# Patient Record
Sex: MALE | Race: WHITE | NOT HISPANIC OR LATINO | Employment: FULL TIME | URBAN - METROPOLITAN AREA
[De-identification: names, ages, dates, MRNs, and addresses within clinical notes are randomized per-mention and may not be internally consistent; named-entity substitution may affect disease eponyms.]

---

## 2022-01-03 PROCEDURE — U0004 COV-19 TEST NON-CDC HGH THRU: HCPCS | Performed by: FAMILY MEDICINE

## 2022-01-04 ENCOUNTER — TELEPHONE (OUTPATIENT)
Dept: URGENT CARE | Facility: CLINIC | Age: 42
End: 2022-01-04

## 2022-01-04 NOTE — TELEPHONE ENCOUNTER
Covid result positive. Patient called and notified of positive result.   Patient advised to go to ER for any concerning symptoms. Patient advised to follow up with PCP.  Patient advised on plenty of fluids. Alternate tylenol and ibuprofen every 4-6 hours as needed for fever or pain.   Patient will complete isolation.   Patient will inform close contacts to quarentine and follow up with PCP.  Please contact [facility] Department for further Covid guidance.   Patient also given recommendations below.         It is recommended that you self isolate for 10 days from the beginning of your symptoms. If you have no symptoms but have been in close contact with someone who is suspected to have Covid 19 or has had a positive test, it is recommended that you self isolate for 14 days from your last exposure to this person. If you have no symptoms but develop symptoms of Covid 19 (fever 100.4 or greater, cough, shortness of breath or loss of taste and smell) during this period, your quarantine restarts from the day your symptoms begin and you should self isolate for 10 days. You may return to work 10 days from symptom onset AND 24 hours afebrile without the use of antipyretics AND improvement in symptoms. If you are still ill at the end of your home isolation, contact your PCP.     If you were prescribed medication take it as directed. Tylenol or Advil, which ever you may tolerate, are effective for fever or body aches. Increase fluids. Rest.       If you become short of breath or have a high fever not controlled by medication, go to the ER.  If you go by private car, wear a mask, call the ER on your arrival from your car and they will direct you from there.    Follow up with your PCP as instructed.